# Patient Record
Sex: MALE | Race: BLACK OR AFRICAN AMERICAN | NOT HISPANIC OR LATINO | URBAN - METROPOLITAN AREA
[De-identification: names, ages, dates, MRNs, and addresses within clinical notes are randomized per-mention and may not be internally consistent; named-entity substitution may affect disease eponyms.]

---

## 2018-05-14 ENCOUNTER — AMBULATORY SURGICAL CENTER (OUTPATIENT)
Dept: URBAN - METROPOLITAN AREA SURGERY 2 | Facility: SURGERY | Age: 53
End: 2018-05-14
Payer: COMMERCIAL

## 2018-05-14 VITALS
SYSTOLIC BLOOD PRESSURE: 125 MMHG | HEART RATE: 65 BPM | DIASTOLIC BLOOD PRESSURE: 73 MMHG | SYSTOLIC BLOOD PRESSURE: 123 MMHG | DIASTOLIC BLOOD PRESSURE: 65 MMHG | HEART RATE: 59 BPM | DIASTOLIC BLOOD PRESSURE: 64 MMHG | WEIGHT: 233 LBS | SYSTOLIC BLOOD PRESSURE: 101 MMHG | TEMPERATURE: 97.9 F | OXYGEN SATURATION: 99 % | DIASTOLIC BLOOD PRESSURE: 64 MMHG | HEIGHT: 74 IN | HEART RATE: 63 BPM | DIASTOLIC BLOOD PRESSURE: 56 MMHG | DIASTOLIC BLOOD PRESSURE: 73 MMHG | SYSTOLIC BLOOD PRESSURE: 125 MMHG | TEMPERATURE: 97.7 F | HEIGHT: 74 IN | SYSTOLIC BLOOD PRESSURE: 101 MMHG | TEMPERATURE: 97.7 F | RESPIRATION RATE: 16 BRPM | HEART RATE: 63 BPM | SYSTOLIC BLOOD PRESSURE: 111 MMHG | HEART RATE: 62 BPM | HEART RATE: 65 BPM | TEMPERATURE: 97.9 F | HEART RATE: 62 BPM | HEART RATE: 59 BPM | RESPIRATION RATE: 16 BRPM | DIASTOLIC BLOOD PRESSURE: 56 MMHG | OXYGEN SATURATION: 100 % | WEIGHT: 233 LBS | OXYGEN SATURATION: 99 % | SYSTOLIC BLOOD PRESSURE: 123 MMHG | SYSTOLIC BLOOD PRESSURE: 111 MMHG | DIASTOLIC BLOOD PRESSURE: 65 MMHG | OXYGEN SATURATION: 100 %

## 2018-05-14 DIAGNOSIS — Z12.11 ENCOUNTER FOR SCREENING FOR MALIGNANT NEOPLASM OF COLON: ICD-10-CM

## 2024-06-17 ENCOUNTER — OFFICE (OUTPATIENT)
Dept: URBAN - METROPOLITAN AREA CLINIC 19 | Facility: CLINIC | Age: 59
End: 2024-06-17

## 2024-06-17 VITALS
HEIGHT: 74 IN | HEART RATE: 64 BPM | WEIGHT: 233 LBS | DIASTOLIC BLOOD PRESSURE: 98 MMHG | OXYGEN SATURATION: 98 % | SYSTOLIC BLOOD PRESSURE: 142 MMHG

## 2024-06-17 DIAGNOSIS — D50.9 IRON DEFICIENCY ANEMIA, UNSPECIFIED: ICD-10-CM

## 2024-06-17 PROCEDURE — 99204 OFFICE O/P NEW MOD 45 MIN: CPT

## 2024-06-17 NOTE — SERVICEHPINOTES
59-year-old  black male referred by Ryan Ramires MD (hematology) for evaluation of iron-deficiency anemia diagnosed in March 2024. Outside LFTs, Ferritin and CBC 4/19/24 were unremarkable. Iron profile was unremarkable except for mildly decreased Iron saturation=19% (normal=20-20). He takes oral iron daily.  He is asymptomatic from a GI standpoint. He denies dysphagia, GERD, nausea, abdominal pain, change in bowel habit or any overt GI bleeding.br
br  Colonoscopies 3/1/2000 and 5/14/18 were normal. Family history is negative for colon neoplasm.

## 2024-06-17 NOTE — SERVICENOTES
The patient's assessment was reviewed with Israel Pittman MD and a collaborative plan of care was established.

## 2024-07-30 ENCOUNTER — OFFICE (OUTPATIENT)
Dept: URBAN - METROPOLITAN AREA PATHOLOGY 12 | Facility: PATHOLOGY | Age: 59
End: 2024-07-30
Payer: COMMERCIAL

## 2024-07-30 ENCOUNTER — AMBULATORY SURGICAL CENTER (OUTPATIENT)
Dept: URBAN - METROPOLITAN AREA SURGERY 2 | Facility: SURGERY | Age: 59
End: 2024-07-30
Payer: COMMERCIAL

## 2024-07-30 VITALS
OXYGEN SATURATION: 95 % | SYSTOLIC BLOOD PRESSURE: 1 MMHG | SYSTOLIC BLOOD PRESSURE: 157 MMHG | WEIGHT: 232 LBS | DIASTOLIC BLOOD PRESSURE: 75 MMHG | DIASTOLIC BLOOD PRESSURE: 88 MMHG | RESPIRATION RATE: 18 BRPM | TEMPERATURE: 98.4 F | RESPIRATION RATE: 16 BRPM | SYSTOLIC BLOOD PRESSURE: 131 MMHG | HEIGHT: 74 IN | DIASTOLIC BLOOD PRESSURE: 75 MMHG | TEMPERATURE: 98.4 F | SYSTOLIC BLOOD PRESSURE: 1 MMHG | OXYGEN SATURATION: 96 % | OXYGEN SATURATION: 94 % | HEART RATE: 54 BPM | HEART RATE: 54 BPM | OXYGEN SATURATION: 96 % | HEART RATE: 59 BPM | OXYGEN SATURATION: 98 % | HEART RATE: 74 BPM | DIASTOLIC BLOOD PRESSURE: 86 MMHG | SYSTOLIC BLOOD PRESSURE: 176 MMHG | HEART RATE: 74 BPM | DIASTOLIC BLOOD PRESSURE: 116 MMHG | DIASTOLIC BLOOD PRESSURE: 116 MMHG | SYSTOLIC BLOOD PRESSURE: 157 MMHG | OXYGEN SATURATION: 94 % | SYSTOLIC BLOOD PRESSURE: 146 MMHG | WEIGHT: 232 LBS | HEART RATE: 55 BPM | RESPIRATION RATE: 16 BRPM | DIASTOLIC BLOOD PRESSURE: 86 MMHG | DIASTOLIC BLOOD PRESSURE: 88 MMHG | SYSTOLIC BLOOD PRESSURE: 176 MMHG | SYSTOLIC BLOOD PRESSURE: 146 MMHG | OXYGEN SATURATION: 95 % | TEMPERATURE: 97.8 F | TEMPERATURE: 97.8 F | HEIGHT: 74 IN | OXYGEN SATURATION: 98 % | SYSTOLIC BLOOD PRESSURE: 131 MMHG | HEART RATE: 55 BPM | RESPIRATION RATE: 18 BRPM | HEART RATE: 59 BPM

## 2024-07-30 DIAGNOSIS — D12.3 BENIGN NEOPLASM OF TRANSVERSE COLON: ICD-10-CM

## 2024-07-30 DIAGNOSIS — Z12.11 ENCOUNTER FOR SCREENING FOR MALIGNANT NEOPLASM OF COLON: ICD-10-CM

## 2024-07-30 DIAGNOSIS — D50.9 IRON DEFICIENCY ANEMIA, UNSPECIFIED: ICD-10-CM

## 2024-07-30 DIAGNOSIS — K31.89 OTHER DISEASES OF STOMACH AND DUODENUM: ICD-10-CM

## 2024-07-30 PROBLEM — K63.5 POLYP OF COLON: Status: ACTIVE | Noted: 2024-07-30

## 2024-07-30 PROCEDURE — 45380 COLONOSCOPY AND BIOPSY: CPT | Mod: 33 | Performed by: INTERNAL MEDICINE

## 2024-07-30 PROCEDURE — 88305 TISSUE EXAM BY PATHOLOGIST: CPT | Performed by: STUDENT IN AN ORGANIZED HEALTH CARE EDUCATION/TRAINING PROGRAM

## 2024-07-30 PROCEDURE — 43239 EGD BIOPSY SINGLE/MULTIPLE: CPT | Mod: 51 | Performed by: INTERNAL MEDICINE

## 2024-08-01 LAB
GASTRO ONE PATHOLOGY: PDF REPORT: (no result)
GASTRO ONE PATHOLOGY: PDF REPORT: (no result)